# Patient Record
Sex: MALE | Race: WHITE | NOT HISPANIC OR LATINO | Employment: OTHER | URBAN - METROPOLITAN AREA
[De-identification: names, ages, dates, MRNs, and addresses within clinical notes are randomized per-mention and may not be internally consistent; named-entity substitution may affect disease eponyms.]

---

## 2017-04-14 ENCOUNTER — OFFICE VISIT (OUTPATIENT)
Dept: ONCOLOGY | Facility: CLINIC | Age: 57
End: 2017-04-14

## 2017-04-14 ENCOUNTER — LAB (OUTPATIENT)
Dept: LAB | Facility: HOSPITAL | Age: 57
End: 2017-04-14

## 2017-04-14 ENCOUNTER — TELEPHONE (OUTPATIENT)
Dept: ONCOLOGY | Facility: CLINIC | Age: 57
End: 2017-04-14

## 2017-04-14 VITALS
BODY MASS INDEX: 28.88 KG/M2 | HEART RATE: 73 BPM | HEIGHT: 69 IN | SYSTOLIC BLOOD PRESSURE: 143 MMHG | DIASTOLIC BLOOD PRESSURE: 92 MMHG | RESPIRATION RATE: 16 BRPM | WEIGHT: 195 LBS | TEMPERATURE: 97.9 F

## 2017-04-14 DIAGNOSIS — D69.6 THROMBOCYTOPENIA (HCC): Primary | ICD-10-CM

## 2017-04-14 DIAGNOSIS — D69.6 THROMBOCYTOPENIA (HCC): ICD-10-CM

## 2017-04-14 DIAGNOSIS — R79.89 ELEVATED FERRITIN LEVEL: ICD-10-CM

## 2017-04-14 LAB
ERYTHROCYTE [DISTWIDTH] IN BLOOD BY AUTOMATED COUNT: 13.3 % (ref 11.3–14.5)
FERRITIN SERPL-MCNC: 344 NG/ML (ref 22–322)
HCT VFR BLD AUTO: 47.4 % (ref 38.9–50.9)
HGB BLD-MCNC: 15.6 G/DL (ref 13.1–17.5)
IRON 24H UR-MRATE: 87 MCG/DL (ref 50–175)
IRON SATN MFR SERPL: 25 % (ref 20–50)
LYMPHOCYTES # BLD AUTO: 1.3 10*3/MM3 (ref 0.6–4.8)
LYMPHOCYTES NFR BLD AUTO: 27.8 % (ref 24–44)
MCH RBC QN AUTO: 30.8 PG (ref 27–31)
MCHC RBC AUTO-ENTMCNC: 33 G/DL (ref 32–36)
MCV RBC AUTO: 93.4 FL (ref 80–99)
MONOCYTES # BLD AUTO: 0.2 10*3/MM3 (ref 0–1)
MONOCYTES NFR BLD AUTO: 5.5 % (ref 0–12)
NEUTROPHILS # BLD AUTO: 3 10*3/MM3 (ref 1.5–8.3)
NEUTROPHILS NFR BLD AUTO: 66.7 % (ref 41–71)
PLATELET # BLD AUTO: 119 10*3/MM3 (ref 150–450)
PMV BLD AUTO: 8 FL (ref 6–12)
RBC # BLD AUTO: 5.07 10*6/MM3 (ref 4.2–5.76)
TIBC SERPL-MCNC: 343 MCG/DL (ref 250–450)
WBC NRBC COR # BLD: 4.5 10*3/MM3 (ref 3.5–10.8)

## 2017-04-14 PROCEDURE — 83550 IRON BINDING TEST: CPT

## 2017-04-14 PROCEDURE — 85025 COMPLETE CBC W/AUTO DIFF WBC: CPT

## 2017-04-14 PROCEDURE — 83540 ASSAY OF IRON: CPT

## 2017-04-14 PROCEDURE — 82728 ASSAY OF FERRITIN: CPT

## 2017-04-14 PROCEDURE — 99214 OFFICE O/P EST MOD 30 MIN: CPT | Performed by: NURSE PRACTITIONER

## 2017-04-14 PROCEDURE — 36415 COLL VENOUS BLD VENIPUNCTURE: CPT

## 2017-04-14 RX ORDER — AMITRIPTYLINE HYDROCHLORIDE 10 MG/1
TABLET, FILM COATED ORAL
Refills: 99 | COMMUNITY
Start: 2017-04-07 | End: 2018-11-09

## 2017-04-14 RX ORDER — OMEPRAZOLE 20 MG/1
CAPSULE, DELAYED RELEASE ORAL
Refills: 98 | COMMUNITY
Start: 2017-04-07 | End: 2018-11-09

## 2017-04-14 NOTE — TELEPHONE ENCOUNTER
Attempted to contact patient to inform of normal labs, automated message says number is not reachable

## 2017-04-14 NOTE — PROGRESS NOTES
DATE OF VISIT: 4/14/2017    REASON FOR VISIT: Followup for hyperferritnemia.      HISTORY OF PRESENT ILLNESS: The patient is a very pleasant 55-year-old  gentleman who was referred to me on 12/01/2015 for elevated serum ferritin  level that presented on routine blood work. The patient's serum ferritin was  above 600. He did not have any associated symptoms. The patient has a family  history of hemochromatosis in his brother. The patient is here today in  scheduled follow-up visit.     SUBJECTIVE: The patient has been doing fairly well. He has done phlebotomy twice since her last visit. He is feeling well other than migraine headaches for which he was started on Elavil. He is having some morning, but has only been taking it for one week. He has had no chest pain, shortness of breath, fever, chills, or night sweats.     PAST MEDICAL HISTORY/SOCIAL HISTORY/FAMILY HISTORY: Unchanged from my prior documentation done on 12/01/2015.    Review of Systems   Constitutional: Negative for activity change, appetite change, chills, fatigue, fever and unexpected weight change.   HENT: Negative for hearing loss, mouth sores, nosebleeds, sore throat and trouble swallowing.    Eyes: Negative for visual disturbance.   Respiratory: Negative for cough, chest tightness, shortness of breath and wheezing.    Cardiovascular: Negative for chest pain, palpitations and leg swelling.   Gastrointestinal: Negative for abdominal distention, abdominal pain, blood in stool, constipation, diarrhea, nausea, rectal pain and vomiting.   Endocrine: Negative for cold intolerance and heat intolerance.   Genitourinary: Negative for difficulty urinating, dysuria, frequency and urgency.   Musculoskeletal: Negative for arthralgias, back pain, gait problem, joint swelling and myalgias.   Skin: Negative for rash.   Neurological: Positive for headaches. Negative for dizziness, tremors, syncope, weakness, light-headedness and numbness.   Hematological: Negative  for adenopathy. Does not bruise/bleed easily.   Psychiatric/Behavioral: Negative for confusion, sleep disturbance and suicidal ideas. The patient is not nervous/anxious.        No current outpatient prescriptions on file.    PHYSICAL EXAMINATION:   There were no vitals taken for this visit.   ECOG Performance Status: 1 - Symptomatic but completely ambulatory  General Appearance:  alert, cooperative, no apparent distress and appears stated age   Neurologic/Psychiatric: A&O x 3, gait steady, appropriate affect, strength 5/5 in all muscle groups   HEENT:  Normocephalic, without obvious abnormality, mucous membranes moist   Neck: Supple, symmetrical, trachea midline, no adenopathy;  No thyromegaly, masses, or tenderness   Lungs:   Clear to auscultation bilaterally; respirations regular, even, and unlabored bilaterally   Heart:  Regular rate and rhythm, no murmurs appreciated   Abdomen:   Soft, non-tender, non-distended and no organomegaly   Lymph nodes: No cervical, supraclavicular, inguinal or axillary adenopathy noted   Extremities: Normal, atraumatic; no clubbing, cyanosis, or edema    Skin: No rashes, ulcers, or suspicious lesions noted     No visits with results within 2 Week(s) from this visit.  Latest known visit with results is:    Lab on 06/24/2016   Component Date Value Ref Range Status   • Ferritin 06/24/2016 745.0* 22.0 - 322.0 ng/mL Final   • Iron 06/24/2016 104  50 - 175 mcg/dL Final   • TIBC 06/24/2016 334  250 - 450 mcg/dL Final   • Transferrin % 06/24/2016 31.14  20.00 - 50.00 % Final   • Hemochromatosis Gene 06/24/2016 Comment   Final    Comment: NO MUTATION IDENTIFIED  Interpretation:  This patient's sample was analyzed for the hereditary  hemochromatosis (HH) mutations C282Y, H63D, S65C. No  mutation was identified. The mutations analyzed by [facility]  are most common in the  population, and up to 90%  of affected Caucasians will have a positive test result.  Because this panel does not  identify rare HH mutations or  HH mutations found in other ethnic groups, there are a  small number of people who may have a negative test but may  actually be affected. The diagnosis of HH should include  clinical findings and other test results such as  transferrin-iron saturation and/or serum ferritin studies  and/or liver biopsy.  If this patient has a history of HH,  in many cases a specific carrier risk can be determined  based on this negative result.  Methodology:  DNA Analysis of the HFE gene was performed by PCR  amplification followed by restriction enzyme digestion  analyses.  Reference:  Yaniv JS and Wilberto AP. (2000).                            Emmy Test 4:.  Oumar PRECIADO et al. (1999). AM J Prev Med 16:134-140.  Zuleyma FOX (2002). Lancet 360(3461):1673-27.  Harleen Sena et al. (2002). Blood Cells, Molecules. and  Diseases.  29(3):418-432.  Akil HUSSEIN et al. (2003). Emmy Med. 5(1):1-8.  Oumar PRECIADO et al. (2003). Emmy Med. 5(4):304-10.  Genetic counselors are available for health care providers to discuss  results at [phone].  Linda eSnior, PhD, FAC  Elidia Alves, PhD, FAC  Tiffanie Sultana, PhD, FAC  Shelby Rodriguez MRonnyS., PhD, FACMG  Nely Cervantes, PhD, FACMG  Sage Benjamin, PhD, FAC  Giancarlo Beach, PhD, FACMG   • WBC 06/24/2016 4.60  3.50 - 10.80 10*3/mm3 Final   • RBC 06/24/2016 4.82  4.20 - 5.76 10*6/mm3 Final   • Hemoglobin 06/24/2016 15.4  13.1 - 17.5 g/dL Final   • Hematocrit 06/24/2016 43.3  38.9 - 50.9 % Final   • MCV 06/24/2016 89.8  80.0 - 99.0 fL Final   • MCH 06/24/2016 32.0* 27.0 - 31.0 pg Final   • MCHC 06/24/2016 35.6  32.0 - 36.0 g/dL Final   • RDW 06/24/2016 13.4  11.3 - 14.5 % Final   • RDW-SD 06/24/2016 43.8  37.0 - 54.0 fl Final   • MPV 06/24/2016 10.2  6.0 - 12.0 fL Final   • Platelets 06/24/2016 108* 150 - 450 10*3/mm3 Final   • Neutrophil % 06/24/2016 62.5  41.0 - 71.0 % Final   • Lymphocyte % 06/24/2016 26.7  24.0 - 44.0 % Final   •  Monocyte % 06/24/2016 7.0  0.0 - 12.0 % Final   • Eosinophil % 06/24/2016 2.4  0.0 - 3.0 % Final   • Basophil % 06/24/2016 0.7  0.0 - 1.0 % Final   • Immature Grans % 06/24/2016 0.7* 0.0 - 0.6 % Final   • Neutrophils, Absolute 06/24/2016 2.88  1.50 - 8.30 10*3/mm3 Final   • Lymphocytes, Absolute 06/24/2016 1.23  0.60 - 4.80 10*3/mm3 Final   • Monocytes, Absolute 06/24/2016 0.32  0.00 - 1.00 10*3/mm3 Final   • Eosinophils, Absolute 06/24/2016 0.11  0.10 - 0.30 10*3/mm3 Final   • Basophils, Absolute 06/24/2016 0.03  0.00 - 0.20 10*3/mm3 Final   • Immature Grans, Absolute 06/24/2016 0.03  0.00 - 0.03 10*3/mm3 Final        No results found.    ASSESSMENT: The patient is a very pleasant 56-year-old gentleman with  Hyperferritinemia.    Problem List:  1. Thrombocytopenia  2. 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 His ferritin is not back yet, but his hemoglobin is normal, with platelet count of 119,000. We will follow up on his ferritin level and notify him of any significant abnormalities.   2. The patient will continue phlebotomy once every 3 months. We will change the frequency if the patient's ferritin is low.   3. The patient will return in 6 months with repeat CBC, ferritin, and iron panel.   4. He will continue Elavil at bedtime for migraine headaches.     Liliane Page, APRN  4/14/2017

## 2017-09-26 ENCOUNTER — LAB (OUTPATIENT)
Dept: LAB | Facility: HOSPITAL | Age: 57
End: 2017-09-26

## 2017-09-26 ENCOUNTER — OFFICE VISIT (OUTPATIENT)
Dept: ONCOLOGY | Facility: CLINIC | Age: 57
End: 2017-09-26

## 2017-09-26 VITALS
DIASTOLIC BLOOD PRESSURE: 88 MMHG | HEIGHT: 69 IN | SYSTOLIC BLOOD PRESSURE: 157 MMHG | BODY MASS INDEX: 28.14 KG/M2 | RESPIRATION RATE: 16 BRPM | HEART RATE: 56 BPM | TEMPERATURE: 97.5 F | WEIGHT: 190 LBS

## 2017-09-26 DIAGNOSIS — R79.89 ELEVATED FERRITIN LEVEL: ICD-10-CM

## 2017-09-26 DIAGNOSIS — D69.6 THROMBOCYTOPENIA (HCC): Primary | ICD-10-CM

## 2017-09-26 DIAGNOSIS — D69.6 THROMBOCYTOPENIA (HCC): ICD-10-CM

## 2017-09-26 LAB
ERYTHROCYTE [DISTWIDTH] IN BLOOD BY AUTOMATED COUNT: 13.5 % (ref 11.3–14.5)
FERRITIN SERPL-MCNC: 325 NG/ML (ref 22–322)
HCT VFR BLD AUTO: 48.2 % (ref 38.9–50.9)
HGB BLD-MCNC: 16.1 G/DL (ref 13.1–17.5)
IRON 24H UR-MRATE: 125 MCG/DL (ref 50–175)
IRON SATN MFR SERPL: 41 % (ref 20–50)
LYMPHOCYTES # BLD AUTO: 1.7 10*3/MM3 (ref 0.6–4.8)
LYMPHOCYTES NFR BLD AUTO: 31.2 % (ref 24–44)
MCH RBC QN AUTO: 31.3 PG (ref 27–31)
MCHC RBC AUTO-ENTMCNC: 33.5 G/DL (ref 32–36)
MCV RBC AUTO: 93.3 FL (ref 80–99)
MONOCYTES # BLD AUTO: 0.5 10*3/MM3 (ref 0–1)
MONOCYTES NFR BLD AUTO: 8.8 % (ref 0–12)
NEUTROPHILS # BLD AUTO: 3.2 10*3/MM3 (ref 1.5–8.3)
NEUTROPHILS NFR BLD AUTO: 60 % (ref 41–71)
PLATELET # BLD AUTO: 131 10*3/MM3 (ref 150–450)
PMV BLD AUTO: 8.1 FL (ref 6–12)
RBC # BLD AUTO: 5.16 10*6/MM3 (ref 4.2–5.76)
TIBC SERPL-MCNC: 308 MCG/DL (ref 250–450)
WBC NRBC COR # BLD: 5.3 10*3/MM3 (ref 3.5–10.8)

## 2017-09-26 PROCEDURE — 82728 ASSAY OF FERRITIN: CPT | Performed by: NURSE PRACTITIONER

## 2017-09-26 PROCEDURE — 36415 COLL VENOUS BLD VENIPUNCTURE: CPT

## 2017-09-26 PROCEDURE — 85025 COMPLETE CBC W/AUTO DIFF WBC: CPT

## 2017-09-26 PROCEDURE — 83550 IRON BINDING TEST: CPT | Performed by: NURSE PRACTITIONER

## 2017-09-26 PROCEDURE — 99214 OFFICE O/P EST MOD 30 MIN: CPT | Performed by: INTERNAL MEDICINE

## 2017-09-26 PROCEDURE — 83540 ASSAY OF IRON: CPT | Performed by: NURSE PRACTITIONER

## 2017-09-26 RX ORDER — SUMATRIPTAN 100 MG/1
TABLET, FILM COATED ORAL
COMMUNITY
Start: 2017-09-25

## 2017-09-26 RX ORDER — INFLUENZA VIRUS VACCINE 15; 15; 15; 15 UG/.5ML; UG/.5ML; UG/.5ML; UG/.5ML
SUSPENSION INTRAMUSCULAR
Refills: 0 | COMMUNITY
Start: 2017-09-19

## 2017-09-26 RX ORDER — PROPRANOLOL HYDROCHLORIDE 80 MG/1
CAPSULE, EXTENDED RELEASE ORAL
Refills: 1 | COMMUNITY
Start: 2017-09-19

## 2017-09-26 NOTE — PROGRESS NOTES
DATE OF VISIT: 9/26/2017    REASON FOR VISIT: Followup for hyperferritnemia.      HISTORY OF PRESENT ILLNESS: The patient is a very pleasant 55-year-old  gentleman who was referred to me on 12/01/2015 for elevated serum ferritin  level that presented on routine blood work. The patient's serum ferritin was  above 600. He did not have any associated symptoms. The patient has a family  history of hemochromatosis in his brother. The patient is here today in  scheduled follow-up visit.     SUBJECTIVE: The patient has been doing fairly well. He is feeling well other than migraine headaches for which he was started on Elavil. He is having some morning, but has only been taking it for one week. He has had no chest pain, shortness of breath, fever, chills, or night sweats.     PAST MEDICAL HISTORY/SOCIAL HISTORY/FAMILY HISTORY: Unchanged from my prior documentation done on 12/01/2015.    Review of Systems   Constitutional: Negative for activity change, appetite change, chills, fatigue, fever and unexpected weight change.   HENT: Negative for hearing loss, mouth sores, nosebleeds, sore throat and trouble swallowing.    Eyes: Negative for visual disturbance.   Respiratory: Negative for cough, chest tightness, shortness of breath and wheezing.    Cardiovascular: Negative for chest pain, palpitations and leg swelling.   Gastrointestinal: Negative for abdominal distention, abdominal pain, blood in stool, constipation, diarrhea, nausea, rectal pain and vomiting.   Endocrine: Negative for cold intolerance and heat intolerance.   Genitourinary: Negative for difficulty urinating, dysuria, frequency and urgency.   Musculoskeletal: Negative for arthralgias, back pain, gait problem, joint swelling and myalgias.   Skin: Negative for rash.   Neurological: Positive for headaches. Negative for dizziness, tremors, syncope, weakness, light-headedness and numbness.   Hematological: Negative for adenopathy. Does not bruise/bleed easily.    Psychiatric/Behavioral: Negative for confusion, sleep disturbance and suicidal ideas. The patient is not nervous/anxious.          Current Outpatient Prescriptions:   •  amitriptyline (ELAVIL) 10 MG tablet, TAKE 1/2- 1 TABLET AT BEDTIME, Disp: , Rfl: 99  •  omeprazole (priLOSEC) 20 MG capsule, TAKE 1 CAPSULE DAILY, Disp: , Rfl: 98    PHYSICAL EXAMINATION:   There were no vitals taken for this visit.   ECOG Performance Status: 1 - Symptomatic but completely ambulatory  General Appearance:  alert, cooperative, no apparent distress and appears stated age   Neurologic/Psychiatric: A&O x 3, gait steady, appropriate affect, strength 5/5 in all muscle groups   HEENT:  Normocephalic, without obvious abnormality, mucous membranes moist   Neck: Supple, symmetrical, trachea midline, no adenopathy;  No thyromegaly, masses, or tenderness   Lungs:   Clear to auscultation bilaterally; respirations regular, even, and unlabored bilaterally   Heart:  Regular rate and rhythm, no murmurs appreciated   Abdomen:   Soft, non-tender, non-distended and no organomegaly   Lymph nodes: No cervical, supraclavicular, inguinal or axillary adenopathy noted   Extremities: Normal, atraumatic; no clubbing, cyanosis, or edema    Skin: No rashes, ulcers, or suspicious lesions noted     No visits with results within 2 Week(s) from this visit.  Latest known visit with results is:    Lab on 04/14/2017   Component Date Value Ref Range Status   • Ferritin 04/14/2017 344.00* 22.00 - 322.00 ng/mL Final   • Iron 04/14/2017 87  50 - 175 mcg/dL Final   • TIBC 04/14/2017 343  250 - 450 mcg/dL Final   • Iron Saturation 04/14/2017 25  20 - 50 % Final   • WBC 04/14/2017 4.50  3.50 - 10.80 10*3/mm3 Final   • RBC 04/14/2017 5.07  4.20 - 5.76 10*6/mm3 Final   • Hemoglobin 04/14/2017 15.6  13.1 - 17.5 g/dL Final   • Hematocrit 04/14/2017 47.4  38.9 - 50.9 % Final   • RDW 04/14/2017 13.3  11.3 - 14.5 % Final   • MCV 04/14/2017 93.4  80.0 - 99.0 fL Final   • MCH  04/14/2017 30.8  27.0 - 31.0 pg Final   • MCHC 04/14/2017 33.0  32.0 - 36.0 g/dL Final   • MPV 04/14/2017 8.0  6.0 - 12.0 fL Final   • Platelets 04/14/2017 119* 150 - 450 10*3/mm3 Final   • Neutrophil % 04/14/2017 66.7  41.0 - 71.0 % Final   • Lymphocyte % 04/14/2017 27.8  24.0 - 44.0 % Final   • Monocyte % 04/14/2017 5.5  0.0 - 12.0 % Final   • Neutrophils, Absolute 04/14/2017 3.00  1.50 - 8.30 10*3/mm3 Final   • Lymphocytes, Absolute 04/14/2017 1.30  0.60 - 4.80 10*3/mm3 Final   • Monocytes, Absolute 04/14/2017 0.20  0.00 - 1.00 10*3/mm3 Final        No results found.    ASSESSMENT: The patient is a very pleasant 56-year-old gentleman with  Hyperferritinemia.    Problem List:  1. Thrombocytopenia  2. Hyperferritinemia  3. 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 I reviewed the blood work results with the patient. I explained to him that his thrombocytopenia is mild and stable.  I will follow-up on the iron panel.  2. The patient will continue phlebotomy once every 4-6 months. We will change the frequency if the patient's ferritin is low.   3. The patient will return in 1 year with repeat CBC, ferritin, CMP, and iron panel.  4. I will continue Elavil at bedtime for migraine headaches.     Scribed for Noah Vernon MD by HRASH Nazario. 9/26/2017  8:24 AM  Noah Vernon MD  9/26/2017   I, Noah Vernon MD, personally performed the services described in this documentation as scribed by the above named individual in my presence, and it is both accurate and complete.  9/26/2017  11:08 AM

## 2018-11-08 DIAGNOSIS — R79.89 ELEVATED FERRITIN LEVEL: ICD-10-CM

## 2018-11-08 DIAGNOSIS — D69.6 THROMBOCYTOPENIA (HCC): Primary | ICD-10-CM

## 2018-11-09 ENCOUNTER — LAB (OUTPATIENT)
Dept: LAB | Facility: HOSPITAL | Age: 58
End: 2018-11-09

## 2018-11-09 ENCOUNTER — OFFICE VISIT (OUTPATIENT)
Dept: ONCOLOGY | Facility: CLINIC | Age: 58
End: 2018-11-09

## 2018-11-09 VITALS
OXYGEN SATURATION: 98 % | BODY MASS INDEX: 30.07 KG/M2 | RESPIRATION RATE: 16 BRPM | HEIGHT: 69 IN | WEIGHT: 203 LBS | HEART RATE: 80 BPM | SYSTOLIC BLOOD PRESSURE: 150 MMHG | DIASTOLIC BLOOD PRESSURE: 100 MMHG | TEMPERATURE: 98.2 F

## 2018-11-09 DIAGNOSIS — R79.89 ELEVATED FERRITIN LEVEL: ICD-10-CM

## 2018-11-09 DIAGNOSIS — D69.6 THROMBOCYTOPENIA (HCC): ICD-10-CM

## 2018-11-09 DIAGNOSIS — D69.6 THROMBOCYTOPENIA (HCC): Primary | ICD-10-CM

## 2018-11-09 LAB
ALBUMIN SERPL-MCNC: 4.75 G/DL (ref 3.2–4.8)
ALBUMIN/GLOB SERPL: 3.3 G/DL (ref 1.5–2.5)
ALP SERPL-CCNC: 76 U/L (ref 25–100)
ALT SERPL W P-5'-P-CCNC: 28 U/L (ref 7–40)
ANION GAP SERPL CALCULATED.3IONS-SCNC: 1 MMOL/L (ref 3–11)
AST SERPL-CCNC: 27 U/L (ref 0–33)
BILIRUB SERPL-MCNC: 1.1 MG/DL (ref 0.3–1.2)
BUN BLD-MCNC: 15 MG/DL (ref 9–23)
BUN/CREAT SERPL: 13.8 (ref 7–25)
CALCIUM SPEC-SCNC: 9.1 MG/DL (ref 8.7–10.4)
CHLORIDE SERPL-SCNC: 104 MMOL/L (ref 99–109)
CO2 SERPL-SCNC: 34 MMOL/L (ref 20–31)
CREAT BLD-MCNC: 1.09 MG/DL (ref 0.6–1.3)
ERYTHROCYTE [DISTWIDTH] IN BLOOD BY AUTOMATED COUNT: 12.9 % (ref 11.3–14.5)
FERRITIN SERPL-MCNC: 362 NG/ML (ref 22–322)
GFR SERPL CREATININE-BSD FRML MDRD: 70 ML/MIN/1.73
GLOBULIN UR ELPH-MCNC: 1.5 GM/DL
GLUCOSE BLD-MCNC: 90 MG/DL (ref 70–100)
HCT VFR BLD AUTO: 45.1 % (ref 38.9–50.9)
HGB BLD-MCNC: 15.4 G/DL (ref 13.1–17.5)
IRON 24H UR-MRATE: 88 MCG/DL (ref 50–175)
IRON SATN MFR SERPL: 27 % (ref 20–50)
LYMPHOCYTES # BLD AUTO: 1.4 10*3/MM3 (ref 0.6–4.8)
LYMPHOCYTES NFR BLD AUTO: 23.3 % (ref 24–44)
MCH RBC QN AUTO: 32.7 PG (ref 27–31)
MCHC RBC AUTO-ENTMCNC: 34.3 G/DL (ref 32–36)
MCV RBC AUTO: 95.4 FL (ref 80–99)
MONOCYTES # BLD AUTO: 0.4 10*3/MM3 (ref 0–1)
MONOCYTES NFR BLD AUTO: 6.5 % (ref 0–12)
NEUTROPHILS # BLD AUTO: 4.2 10*3/MM3 (ref 1.5–8.3)
NEUTROPHILS NFR BLD AUTO: 70.2 % (ref 41–71)
PLATELET # BLD AUTO: 135 10*3/MM3 (ref 150–450)
PMV BLD AUTO: 8 FL (ref 6–12)
POTASSIUM BLD-SCNC: 4 MMOL/L (ref 3.5–5.5)
PROT SERPL-MCNC: 6.2 G/DL (ref 5.7–8.2)
RBC # BLD AUTO: 4.72 10*6/MM3 (ref 4.2–5.76)
SODIUM BLD-SCNC: 139 MMOL/L (ref 132–146)
TIBC SERPL-MCNC: 322 MCG/DL (ref 250–450)
WBC NRBC COR # BLD: 6 10*3/MM3 (ref 3.5–10.8)

## 2018-11-09 PROCEDURE — 80053 COMPREHEN METABOLIC PANEL: CPT

## 2018-11-09 PROCEDURE — 83550 IRON BINDING TEST: CPT

## 2018-11-09 PROCEDURE — 82728 ASSAY OF FERRITIN: CPT

## 2018-11-09 PROCEDURE — 85025 COMPLETE CBC W/AUTO DIFF WBC: CPT

## 2018-11-09 PROCEDURE — 36415 COLL VENOUS BLD VENIPUNCTURE: CPT

## 2018-11-09 PROCEDURE — 83540 ASSAY OF IRON: CPT

## 2018-11-09 PROCEDURE — 99213 OFFICE O/P EST LOW 20 MIN: CPT | Performed by: NURSE PRACTITIONER

## 2018-11-09 NOTE — PROGRESS NOTES
DATE OF VISIT: 11/9/2018    REASON FOR VISIT: Followup for hyperferritnemia.      HISTORY OF PRESENT ILLNESS: The patient is a very pleasant 58-year-old  gentleman who was referred to me on 12/01/2015 for elevated serum ferritin  level that presented on routine blood work. The patient's serum ferritin was  above 600. He did not have any associated symptoms. The patient has a family  history of hemochromatosis in his brother. The patient has been completing therapeutic phlebotomy for management, with current interval every 4-6 months. The patient is here today in scheduled follow-up visit.     SUBJECTIVE: The patient is here today by himself. He has been healthy over the last year. He has been traveling quite a bit over the last year. He has not done phlebotomy in over 6 months secondary to travel. He denies chest pain, worsening headaches, or flushing. He has no shortness of breath or cough. He denies fatigue or weakness. He has had no evidence of increased bleeding or bruising.     PAST MEDICAL HISTORY/SOCIAL HISTORY/FAMILY HISTORY: Unchanged from my prior documentation done on 12/01/2015.    Review of Systems   Constitutional: Negative for activity change, appetite change, chills, fatigue, fever and unexpected weight change.   HENT: Negative for hearing loss, mouth sores, nosebleeds, sore throat and trouble swallowing.    Eyes: Negative for visual disturbance.   Respiratory: Negative for cough, chest tightness, shortness of breath and wheezing.    Cardiovascular: Negative for chest pain, palpitations and leg swelling.   Gastrointestinal: Negative for abdominal distention, abdominal pain, blood in stool, constipation, diarrhea, nausea, rectal pain and vomiting.   Endocrine: Negative for cold intolerance and heat intolerance.   Genitourinary: Negative for difficulty urinating, dysuria, frequency and urgency.   Musculoskeletal: Negative for arthralgias, back pain, gait problem, joint swelling and myalgias.   Skin:  "Negative for rash.   Neurological: Positive for headaches. Negative for dizziness, tremors, syncope, weakness, light-headedness and numbness.   Hematological: Negative for adenopathy. Does not bruise/bleed easily.   Psychiatric/Behavioral: Negative for confusion, sleep disturbance and suicidal ideas. The patient is not nervous/anxious.          Current Outpatient Prescriptions:   •  FLUARIX QUADRIVALENT 0.5 ML suspension prefilled syringe injection, TO BE ADMIN BY THE PHARM, Disp: , Rfl: 0  •  propranolol LA (INDERAL LA) 80 MG 24 hr capsule, TAKE 1 CAPSULE EVERY DAY BY MOUTH AT BEDTIME FOR 30 DAYS., Disp: , Rfl: 1  •  SUMAtriptan (IMITREX) 100 MG tablet, , Disp: , Rfl:     PHYSICAL EXAMINATION:   /100   Pulse 80   Temp 98.2 °F (36.8 °C) (Temporal Artery )   Resp 16   Ht 175.3 cm (69.02\")   Wt 92.1 kg (203 lb)   SpO2 98% Comment: RA  BMI 29.96 kg/m²    ECOG Performance Status: 0 - Asymptomatic  General Appearance:  alert, cooperative, no apparent distress and appears stated age   Neurologic/Psychiatric: A&O x 3, gait steady, appropriate affect, strength 5/5 in all muscle groups   HEENT:  Normocephalic, without obvious abnormality, mucous membranes moist   Neck: Supple, symmetrical, trachea midline, no adenopathy;  No thyromegaly, masses, or tenderness   Lungs:   Clear to auscultation bilaterally; respirations regular, even, and unlabored bilaterally   Heart:  Regular rate and rhythm, no murmurs appreciated   Abdomen:   Soft, non-tender, non-distended and no organomegaly   Lymph nodes: No cervical, supraclavicular, inguinal or axillary adenopathy noted   Extremities: Normal, atraumatic; no clubbing, cyanosis, or edema    Skin: No rashes, ulcers, or suspicious lesions noted     Lab on 11/09/2018   Component Date Value Ref Range Status   • Glucose 11/09/2018 90  70 - 100 mg/dL Final   • BUN 11/09/2018 15  9 - 23 mg/dL Final   • Creatinine 11/09/2018 1.09  0.60 - 1.30 mg/dL Final   • Sodium 11/09/2018 139  " 132 - 146 mmol/L Final   • Potassium 11/09/2018 4.0  3.5 - 5.5 mmol/L Final   • Chloride 11/09/2018 104  99 - 109 mmol/L Final   • CO2 11/09/2018 34.0* 20.0 - 31.0 mmol/L Final   • Calcium 11/09/2018 9.1  8.7 - 10.4 mg/dL Final   • Total Protein 11/09/2018 6.2  5.7 - 8.2 g/dL Final   • Albumin 11/09/2018 4.75  3.20 - 4.80 g/dL Final   • ALT (SGPT) 11/09/2018 28  7 - 40 U/L Final   • AST (SGOT) 11/09/2018 27  0 - 33 U/L Final   • Alkaline Phosphatase 11/09/2018 76  25 - 100 U/L Final   • Total Bilirubin 11/09/2018 1.1  0.3 - 1.2 mg/dL Final   • eGFR Non African Amer 11/09/2018 70  >60 mL/min/1.73 Final   • Globulin 11/09/2018 1.5  gm/dL Final   • A/G Ratio 11/09/2018 3.3* 1.5 - 2.5 g/dL Final   • BUN/Creatinine Ratio 11/09/2018 13.8  7.0 - 25.0 Final   • Anion Gap 11/09/2018 1.0* 3.0 - 11.0 mmol/L Final   • Iron 11/09/2018 88  50 - 175 mcg/dL Final   • TIBC 11/09/2018 322  250 - 450 mcg/dL Final   • Iron Saturation 11/09/2018 27  20 - 50 % Final   • Ferritin 11/09/2018 362.00* 22.00 - 322.00 ng/mL Final   • WBC 11/09/2018 6.00  3.50 - 10.80 10*3/mm3 Final   • RBC 11/09/2018 4.72  4.20 - 5.76 10*6/mm3 Final   • Hemoglobin 11/09/2018 15.4  13.1 - 17.5 g/dL Final   • Hematocrit 11/09/2018 45.1  38.9 - 50.9 % Final   • RDW 11/09/2018 12.9  11.3 - 14.5 % Final   • MCV 11/09/2018 95.4  80.0 - 99.0 fL Final   • MCH 11/09/2018 32.7* 27.0 - 31.0 pg Final   • MCHC 11/09/2018 34.3  32.0 - 36.0 g/dL Final   • MPV 11/09/2018 8.0  6.0 - 12.0 fL Final   • Platelets 11/09/2018 135* 150 - 450 10*3/mm3 Final   • Neutrophil % 11/09/2018 70.2  41.0 - 71.0 % Final   • Lymphocyte % 11/09/2018 23.3* 24.0 - 44.0 % Final   • Monocyte % 11/09/2018 6.5  0.0 - 12.0 % Final   • Neutrophils, Absolute 11/09/2018 4.20  1.50 - 8.30 10*3/mm3 Final   • Lymphocytes, Absolute 11/09/2018 1.40  0.60 - 4.80 10*3/mm3 Final   • Monocytes, Absolute 11/09/2018 0.40  0.00 - 1.00 10*3/mm3 Final        No results found.    ASSESSMENT: The patient is a very  pleasant 58-year-old gentleman with  Hyperferritinemia.    Problem List:  1. Thrombocytopenia  2. Hyperferritinemia  3. 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 Migraine headaches.     PLAN:  1. I reviewed the blood work results with the patient. I explained to him that his thrombocytopenia is mild and stable with platelet count of 135,000.  He hematocrit is normal at 45.1. Ferritin is mildly elevated at 362, with normal iron saturation and serum iron.    2. The patient will continue phlebotomy once every 4-6 months. We will change the frequency in the future if needed. We will send a new order to [facility].    3. The patient will return in 1 year with repeat CBC, ferritin, CMP, and iron panel.  4. I will continue Inderal and Imitrex for migraine headaches.     Liliane Zamudio, APRN  11/9/2018

## 2021-03-03 ENCOUNTER — IMMUNIZATION (OUTPATIENT)
Dept: VACCINE CLINIC | Facility: HOSPITAL | Age: 61
End: 2021-03-03

## 2021-03-03 PROCEDURE — 91300 HC SARSCOV02 VAC 30MCG/0.3ML IM: CPT | Performed by: INTERNAL MEDICINE

## 2021-03-03 PROCEDURE — 0001A: CPT | Performed by: INTERNAL MEDICINE

## 2021-03-24 ENCOUNTER — IMMUNIZATION (OUTPATIENT)
Dept: VACCINE CLINIC | Facility: HOSPITAL | Age: 61
End: 2021-03-24

## 2021-03-24 PROCEDURE — 91300 HC SARSCOV02 VAC 30MCG/0.3ML IM: CPT | Performed by: INTERNAL MEDICINE

## 2021-03-24 PROCEDURE — 0002A: CPT | Performed by: INTERNAL MEDICINE
